# Patient Record
Sex: MALE | Race: WHITE | NOT HISPANIC OR LATINO | Employment: OTHER | URBAN - METROPOLITAN AREA
[De-identification: names, ages, dates, MRNs, and addresses within clinical notes are randomized per-mention and may not be internally consistent; named-entity substitution may affect disease eponyms.]

---

## 2023-11-14 DIAGNOSIS — I48.91 ATRIAL FIBRILLATION, UNSPECIFIED TYPE (MULTI): ICD-10-CM

## 2023-11-14 RX ORDER — APIXABAN 5 MG/1
5 TABLET, FILM COATED ORAL 2 TIMES DAILY
COMMUNITY
Start: 2023-02-20 | End: 2023-11-14 | Stop reason: SDUPTHER

## 2023-11-14 RX ORDER — APIXABAN 5 MG/1
5 TABLET, FILM COATED ORAL 2 TIMES DAILY
Qty: 180 TABLET | Refills: 0 | Status: SHIPPED
Start: 2023-11-14 | End: 2024-03-12 | Stop reason: SDUPTHER

## 2023-11-29 ENCOUNTER — TELEPHONE (OUTPATIENT)
Dept: CARDIOLOGY | Facility: CLINIC | Age: 78
End: 2023-11-29

## 2023-11-29 NOTE — TELEPHONE ENCOUNTER
Good afternoon,  Patient was in the office saying that he sign up for the Patient Assistant Program for Eliquis. The trial is going until Dec 31. He want to make sure that you received the application or some sort of papers in order for him to continue in the program. Any questions please call the patient at [phone].  Thanks  Afsaneh

## 2023-12-05 RX ORDER — FLUTICASONE PROPIONATE AND SALMETEROL 50; 250 UG/1; UG/1
1 POWDER RESPIRATORY (INHALATION) 2 TIMES DAILY
COMMUNITY
Start: 2023-11-06

## 2023-12-05 RX ORDER — ATROPINE SULFATE 10 MG/ML
1 SOLUTION/ DROPS OPHTHALMIC
COMMUNITY
Start: 2023-04-06

## 2023-12-05 RX ORDER — ALBUTEROL SULFATE 90 UG/1
2 AEROSOL, METERED RESPIRATORY (INHALATION)
COMMUNITY
Start: 2023-09-08

## 2023-12-05 RX ORDER — LOSARTAN POTASSIUM 50 MG/1
50 TABLET ORAL DAILY
COMMUNITY

## 2023-12-05 RX ORDER — DORZOLAMIDE HYDROCHLORIDE AND TIMOLOL MALEATE 20; 5 MG/ML; MG/ML
1 SOLUTION/ DROPS OPHTHALMIC 2 TIMES DAILY
COMMUNITY
Start: 2023-02-14

## 2023-12-05 RX ORDER — FLUTICASONE PROPIONATE 50 MCG
2 SPRAY, SUSPENSION (ML) NASAL DAILY
COMMUNITY
Start: 2023-02-21

## 2023-12-05 RX ORDER — ATORVASTATIN CALCIUM 20 MG/1
20 TABLET, FILM COATED ORAL DAILY
COMMUNITY

## 2023-12-05 RX ORDER — CHLORTHALIDONE 25 MG/1
12.5 TABLET ORAL DAILY
COMMUNITY

## 2023-12-05 RX ORDER — BRIMONIDINE TARTRATE 2 MG/ML
2 SOLUTION/ DROPS OPHTHALMIC 2 TIMES DAILY
COMMUNITY

## 2023-12-05 RX ORDER — LATANOPROST 50 UG/ML
1 SOLUTION/ DROPS OPHTHALMIC DAILY
COMMUNITY
Start: 2023-02-14

## 2023-12-05 RX ORDER — TIMOLOL MALEATE 5 MG/ML
SOLUTION/ DROPS OPHTHALMIC DAILY
COMMUNITY
Start: 2021-06-15

## 2023-12-05 RX ORDER — METOPROLOL TARTRATE 100 MG/1
100 TABLET ORAL EVERY 12 HOURS
COMMUNITY

## 2024-03-12 DIAGNOSIS — I48.91 ATRIAL FIBRILLATION, UNSPECIFIED TYPE (MULTI): ICD-10-CM

## 2024-03-12 RX ORDER — APIXABAN 5 MG/1
5 TABLET, FILM COATED ORAL 2 TIMES DAILY
Qty: 180 TABLET | Refills: 0 | Status: SHIPPED | OUTPATIENT
Start: 2024-03-12

## 2024-04-29 ENCOUNTER — TELEPHONE (OUTPATIENT)
Dept: CARDIOLOGY | Facility: CLINIC | Age: 79
End: 2024-04-29

## 2024-05-16 PROBLEM — I48.21 PERMANENT ATRIAL FIBRILLATION (MULTI): Status: ACTIVE | Noted: 2024-05-16

## 2024-06-03 ENCOUNTER — APPOINTMENT (OUTPATIENT)
Dept: CARDIOLOGY | Facility: CLINIC | Age: 79
End: 2024-06-03
Payer: MEDICARE

## 2024-06-25 ENCOUNTER — PREP FOR PROCEDURE (OUTPATIENT)
Dept: SURGERY | Facility: HOSPITAL | Age: 79
End: 2024-06-25

## 2024-06-25 ENCOUNTER — APPOINTMENT (OUTPATIENT)
Dept: SURGERY | Facility: CLINIC | Age: 79
End: 2024-06-25
Payer: MEDICARE

## 2024-06-25 VITALS
DIASTOLIC BLOOD PRESSURE: 105 MMHG | RESPIRATION RATE: 18 BRPM | WEIGHT: 185.6 LBS | OXYGEN SATURATION: 96 % | BODY MASS INDEX: 26.57 KG/M2 | TEMPERATURE: 98.5 F | SYSTOLIC BLOOD PRESSURE: 152 MMHG | HEART RATE: 103 BPM | HEIGHT: 70 IN

## 2024-06-25 DIAGNOSIS — K40.90 INGUINAL HERNIA OF LEFT SIDE WITHOUT OBSTRUCTION OR GANGRENE: Primary | ICD-10-CM

## 2024-06-25 DIAGNOSIS — K40.90 INGUINAL HERNIA OF LEFT SIDE WITHOUT OBSTRUCTION OR GANGRENE: ICD-10-CM

## 2024-06-25 DIAGNOSIS — I48.21 PERMANENT ATRIAL FIBRILLATION (MULTI): Primary | ICD-10-CM

## 2024-06-25 PROCEDURE — 1159F MED LIST DOCD IN RCRD: CPT | Performed by: SURGERY

## 2024-06-25 PROCEDURE — 1160F RVW MEDS BY RX/DR IN RCRD: CPT | Performed by: SURGERY

## 2024-06-25 PROCEDURE — 99204 OFFICE O/P NEW MOD 45 MIN: CPT | Performed by: SURGERY

## 2024-06-25 RX ORDER — CEFAZOLIN SODIUM 2 G/100ML
2 INJECTION, SOLUTION INTRAVENOUS ONCE
Status: CANCELLED | OUTPATIENT
Start: 2024-07-05 | End: 2024-06-25

## 2024-06-25 RX ORDER — SODIUM CHLORIDE, SODIUM LACTATE, POTASSIUM CHLORIDE, CALCIUM CHLORIDE 600; 310; 30; 20 MG/100ML; MG/100ML; MG/100ML; MG/100ML
100 INJECTION, SOLUTION INTRAVENOUS CONTINUOUS
Status: CANCELLED | OUTPATIENT
Start: 2024-07-05

## 2024-06-25 NOTE — PROGRESS NOTES
History Of Present Illness  HPI 78-year-old male with history of permanent atrial fibrillation on Eliquis developed pain in his left groin after working and going up and down a ladder approximately 6 weeks ago.  He was initially evaluated by his primary care physician for pain.  He followed up approximately a week ago where physical exam at that point raised the question of a left inguinal hernia.  He describes the pain as burning moving to his left scrotum.  Denies right groin pain or mass.  He has had a colonoscopy within 10 years.  There is no history of blood mucus or change in bowel habits.  No history of chest pain shortness of breath or leg pain.  No family history of bleeding or infectious complications related to surgery     Past Medical History  He has a past medical history of Essential (primary) hypertension (05/09/2019), Personal history of other diseases of the circulatory system (04/27/2018), and Personal history of other diseases of the circulatory system (05/04/2018).    Surgical History  He has no past surgical history on file.     Social History  He has no history on file for tobacco use, alcohol use, and drug use.    Family History  No family history on file.     Allergies  Patient has no known allergies.    Review of Systems 10 review of systems otherwise negative     Visit Vitals  BP (!) 152/105   Pulse 103   Temp 36.9 °C (98.5 °F) (Temporal)   Resp 18        Physical Exam GENERAL  MENTAL STATUS - Alert. GENERAL APPEARANCE - Cooperative and well groomed. ORIENTATION - Oriented X4. BUILD & NUTRITION - Well nourished and well developed. HYDRATION - Well hydrated.    INTEGUMENTARY  GENERAL CHARACTERISTICS - Overall examination of the patient´s skin reveals no rashes. COLOR - not icteric. SKIN MOISTURE - normal skin moisture. TEMPERATURE - normal worth is noted.    HEAD AND NECK  HEAD  HEAD SHAPE - Atraumatic and normocephalic.  TRACHEA - midline.  THYROID  GLAND CHARACTERISTICS - normal size and  "consistency and no palpable nodules.    EYE  SCLERA/CONJUNCTIVA - BILATERAL - Anicteric.    CHEST AND LUNG EXAM  AUSCULTATION -  BREATH SOUNDS - Clear.    BREAST - Did not examine.    CARDIOVASCULAR  AUSCULTATION: RHYTHM - Regular. HEART SOUNDS - Normal heart sounds.  MURMURS & OTHER HEART SOUNDS - Auscultation of the heart reveals irregular rate and no murmurs.    ABDOMEN  PALPATION/PERCUSSION - Palpation and percussion of the abdomen reveal soft, non tender, no mass and no hepatosplenomegaly.  Reducible left inguinal hernia.  No obvious right inguinal hernia no femoral hernia bilaterally.  Less than 1 cm defect at the umbilicus.    LYMPHATIC  GENERAL LYMPHATICS  BREAST LYMPHATIC EXAM - No cervical adenopathy, supraclavicular adenopathy or axilliary adenopathy.         Last Recorded Vitals  Blood pressure (!) 152/105, pulse 103, temperature 36.9 °C (98.5 °F), temperature source Temporal, resp. rate 18, height 1.765 m (5' 9.5\"), weight 84.2 kg (185 lb 9.6 oz), SpO2 96%.    Relevant Results      No results found.      @[email or web address]@    Assessment/Plan       Symptomatic left inguinal hernia.  I have advocated immediate medical attention for painful and or nonreducible mass and recommended elective repair of the hernia. I have discussed the risks ,benefits and the alternatives to repair as well as the risks and benefits of the alternatives including but not limited to bleeding and infection. I have placed the risk of recurrence with mesh at 1% and up to 2-5% without mesh. I discussed the risk of infection and rejection of the mesh and need for repeat intervention as well as the risk of neuropathy, partial or permanent. I discussed potential for bowel resection in the face of strangulation and/or incarceration, limited use of mesh under these circumstances and increased risk of recurrence.  I have discussed laparoscopic robotically assisted repair of the hernia.  I placed the risk of contralateral hernia at " approximately 20% and if present he wishes to proceed with repair.  We discussed the risk of conversion open procedure injury to surrounding structures.  Questions were answered and the patient agrees to proceed.   Will hold Eliquis 5 days  I spent 30 minutes in the professional and overall care of this patient.      Baldemar Child MD

## 2024-06-25 NOTE — PATIENT INSTRUCTIONS
Thank you for choosing [facility].  Today's exam reveals a left inguinal hernia.  Laparoscopic robotically assisted repair of the left with possible right inguinal hernia repair will be performed Friday, July 5 as discussed.  Please seek immediate medical attention should you develop a painful mass in either groin which you are unable to reduce the recurrence of the abdominal cavity prior to your proposed procedure.  Please stop your Eliquis 5 days prior to surgery.  Contact the office for any questions regarding today's exam or your proposed procedure

## 2024-06-26 ENCOUNTER — APPOINTMENT (OUTPATIENT)
Dept: CARDIOLOGY | Facility: CLINIC | Age: 79
End: 2024-06-26
Payer: MEDICARE

## 2024-06-26 VITALS
HEIGHT: 69 IN | SYSTOLIC BLOOD PRESSURE: 120 MMHG | BODY MASS INDEX: 27.4 KG/M2 | HEART RATE: 110 BPM | WEIGHT: 185 LBS | OXYGEN SATURATION: 96 % | DIASTOLIC BLOOD PRESSURE: 80 MMHG

## 2024-06-26 DIAGNOSIS — I48.21 PERMANENT ATRIAL FIBRILLATION (MULTI): ICD-10-CM

## 2024-06-26 PROCEDURE — 99213 OFFICE O/P EST LOW 20 MIN: CPT | Performed by: INTERNAL MEDICINE

## 2024-06-26 PROCEDURE — 93000 ELECTROCARDIOGRAM COMPLETE: CPT | Performed by: INTERNAL MEDICINE

## 2024-06-26 PROCEDURE — 1159F MED LIST DOCD IN RCRD: CPT | Performed by: INTERNAL MEDICINE

## 2024-06-26 NOTE — LETTER
"June 26, 2024     Alfred Garcia MD  [address] [address]    Patient: Naresh Vang   YOB: 1945   Date of Visit: 6/26/2024       Dear Dr. Alfred Garcia MD:    Thank you for referring Naresh Vang to me for evaluation. Below are my notes for this consultation.  If you have questions, please do not hesitate to call me. I look forward to following your patient along with you.       Sincerely,     James C Ramicone, DO      CC: Baldemar Child MD  ______________________________________________________________________________________    History Of Present Illness:      This is a 78-year-old male with permanent atrial fibrillation.  He is going to have hernia repair surgery in the next 1 to 2 weeks.  No new cardiac complaints at this time.    Review of Systems  Other review of systems negative     Last Recorded Vitals:      8/17/2020     9:15 AM 6/14/2021     8:53 AM 6/14/2021     9:15 AM 6/6/2022     9:00 AM 6/12/2023     9:11 AM 6/25/2024     2:59 PM 6/26/2024     9:11 AM   Vitals   Systolic 122 130 120 134 134 152 120   Diastolic 88 94 82 80 80 105 80   Heart Rate 76 92  112 75 103 110   Temp      36.9 °C (98.5 °F)    Resp      18    Height (in)  1.765 m (5' 9.5\")  1.765 m (5' 9.5\") 1.765 m (5' 9.5\") 1.765 m (5' 9.5\") 1.753 m (5' 9\")   Weight (lb) 180 178.5  175 180 185.6 185   BMI  25.98 kg/m2  25.47 kg/m2 26.2 kg/m2 27.02 kg/m2 27.32 kg/m2   BSA (m2)  1.99 m2  1.97 m2 2 m2 2.03 m2 2.02 m2   Visit Report      Report Report          Allergies:  Patient has no known allergies.    Outpatient Medications:  Current Outpatient Medications   Medication Instructions   • Advair Diskus 250-50 mcg/dose diskus inhaler 1 puff, inhalation, 2 times daily   • albuterol 90 mcg/actuation inhaler 2 puffs, inhalation, 3 times daily RT   • atorvastatin (LIPITOR) 20 mg, oral, Daily   • atropine 1 % ophthalmic solution 1 drop, ophthalmic (eye), Daily RT   • brimonidine (AlphaGAN) 0.2 % " ophthalmic solution 2 drops, Both Eyes, 2 times daily   • chlorthalidone (HYGROTON) 12.5 mg, oral, Daily   • dorzolamide-timoloL (Cosopt) 22.3-6.8 mg/mL ophthalmic solution 1 drop, Both Eyes, 2 times daily   • Eliquis 5 mg, oral, 2 times daily   • fluticasone (Flonase) 50 mcg/actuation nasal spray 2 sprays, Each Nostril, Daily   • latanoprost (Xalatan) 0.005 % ophthalmic solution 1 drop, Both Eyes, Daily   • losartan (COZAAR) 50 mg, oral, Daily   • metoprolol tartrate (LOPRESSOR) 100 mg, oral, Every 12 hours   • timolol (Timoptic) 0.5 % ophthalmic solution Both Eyes, Daily       Physical Exam:    General Appearance:  Alert, oriented, no distress  Skin:  Warm and dry  Head and Neck:  No elevation of JVP, no carotid bruits  Cardiac Exam:  Rhythm is irregular, S1 and S2 are normal, no murmur S3 or S4  Lungs:  Clear to auscultation  Extremities:  no edema  Neurologic:  No focal deficits  Psychiatric:  Appropriate mood and behavior         Cardiology Tests:  I have personally review the diagnostic cardiac testing and my interpretation is as follows:    EKG: Atrial fibrillation with a controlled ventricular response  Holter monitor June 2023: Atrial fibrillation with an average heart rate of 99 bpm      Assessment/Plan  Problem List Items Addressed This Visit             ICD-10-CM    Permanent atrial fibrillation (Multi) I48.21     1.  Permanent atrial fibrillation: Naresh remains in atrial fibrillation with a controlled ventricular response.  Continue metoprolol at the same dosage and he will remain on Eliquis for anticoagulation.  In preparation for the hernia repair surgery, he can hold Eliquis 2 to 3 days preoperatively, then resume when cleared from a surgical perspective postoperatively.         Relevant Orders    ECG 12 lead (Clinic Performed) (Completed)    Transthoracic echo (TTE) complete James C Ramicone, DO

## 2024-06-26 NOTE — ASSESSMENT & PLAN NOTE
1.  Permanent atrial fibrillation: Naresh remains in atrial fibrillation with a controlled ventricular response.  Continue metoprolol at the same dosage and he will remain on Eliquis for anticoagulation.  In preparation for the hernia repair surgery, he can hold Eliquis 2 to 3 days preoperatively, then resume when cleared from a surgical perspective postoperatively.

## 2024-06-26 NOTE — PATIENT INSTRUCTIONS
Stop your Eliquis 2 full days before your hernia repair surgery.    Follow up with Dr. Ramicone in 1 year.

## 2024-06-26 NOTE — PROGRESS NOTES
"History Of Present Illness:      This is a 78-year-old male with permanent atrial fibrillation.  He is going to have hernia repair surgery in the next 1 to 2 weeks.  No new cardiac complaints at this time.    Review of Systems  Other review of systems negative     Last Recorded Vitals:      8/17/2020     9:15 AM 6/14/2021     8:53 AM 6/14/2021     9:15 AM 6/6/2022     9:00 AM 6/12/2023     9:11 AM 6/25/2024     2:59 PM 6/26/2024     9:11 AM   Vitals   Systolic 122 130 120 134 134 152 120   Diastolic 88 94 82 80 80 105 80   Heart Rate 76 92  112 75 103 110   Temp      36.9 °C (98.5 °F)    Resp      18    Height (in)  1.765 m (5' 9.5\")  1.765 m (5' 9.5\") 1.765 m (5' 9.5\") 1.765 m (5' 9.5\") 1.753 m (5' 9\")   Weight (lb) 180 178.5  175 180 185.6 185   BMI  25.98 kg/m2  25.47 kg/m2 26.2 kg/m2 27.02 kg/m2 27.32 kg/m2   BSA (m2)  1.99 m2  1.97 m2 2 m2 2.03 m2 2.02 m2   Visit Report      Report Report          Allergies:  Patient has no known allergies.    Outpatient Medications:  Current Outpatient Medications   Medication Instructions    Advair Diskus 250-50 mcg/dose diskus inhaler 1 puff, inhalation, 2 times daily    albuterol 90 mcg/actuation inhaler 2 puffs, inhalation, 3 times daily RT    atorvastatin (LIPITOR) 20 mg, oral, Daily    atropine 1 % ophthalmic solution 1 drop, ophthalmic (eye), Daily RT    brimonidine (AlphaGAN) 0.2 % ophthalmic solution 2 drops, Both Eyes, 2 times daily    chlorthalidone (HYGROTON) 12.5 mg, oral, Daily    dorzolamide-timoloL (Cosopt) 22.3-6.8 mg/mL ophthalmic solution 1 drop, Both Eyes, 2 times daily    Eliquis 5 mg, oral, 2 times daily    fluticasone (Flonase) 50 mcg/actuation nasal spray 2 sprays, Each Nostril, Daily    latanoprost (Xalatan) 0.005 % ophthalmic solution 1 drop, Both Eyes, Daily    losartan (COZAAR) 50 mg, oral, Daily    metoprolol tartrate (LOPRESSOR) 100 mg, oral, Every 12 hours    timolol (Timoptic) 0.5 % ophthalmic solution Both Eyes, Daily       Physical " Exam:    General Appearance:  Alert, oriented, no distress  Skin:  Warm and dry  Head and Neck:  No elevation of JVP, no carotid bruits  Cardiac Exam:  Rhythm is irregular, S1 and S2 are normal, no murmur S3 or S4  Lungs:  Clear to auscultation  Extremities:  no edema  Neurologic:  No focal deficits  Psychiatric:  Appropriate mood and behavior         Cardiology Tests:  I have personally review the diagnostic cardiac testing and my interpretation is as follows:    EKG: Atrial fibrillation with a controlled ventricular response  Holter monitor June 2023: Atrial fibrillation with an average heart rate of 99 bpm      Assessment/Plan   Problem List Items Addressed This Visit             ICD-10-CM    Permanent atrial fibrillation (Multi) I48.21     1.  Permanent atrial fibrillation: Naresh remains in atrial fibrillation with a controlled ventricular response.  Continue metoprolol at the same dosage and he will remain on Eliquis for anticoagulation.  In preparation for the hernia repair surgery, he can hold Eliquis 2 to 3 days preoperatively, then resume when cleared from a surgical perspective postoperatively.         Relevant Orders    ECG 12 lead (Clinic Performed) (Completed)    Transthoracic echo (TTE) complete       James C Ramicone, DO

## 2024-06-27 ENCOUNTER — LAB (OUTPATIENT)
Dept: LAB | Facility: LAB | Age: 79
End: 2024-06-27
Payer: MEDICARE

## 2024-06-27 DIAGNOSIS — K40.90 INGUINAL HERNIA OF LEFT SIDE WITHOUT OBSTRUCTION OR GANGRENE: ICD-10-CM

## 2024-06-27 LAB
ANION GAP SERPL CALC-SCNC: 12 MMOL/L (ref 10–20)
BUN SERPL-MCNC: 24 MG/DL (ref 6–23)
CALCIUM SERPL-MCNC: 9.7 MG/DL (ref 8.6–10.3)
CHLORIDE SERPL-SCNC: 101 MMOL/L (ref 98–107)
CO2 SERPL-SCNC: 29 MMOL/L (ref 21–32)
CREAT SERPL-MCNC: 1.22 MG/DL (ref 0.5–1.3)
EGFRCR SERPLBLD CKD-EPI 2021: 61 ML/MIN/1.73M*2
ERYTHROCYTE [DISTWIDTH] IN BLOOD BY AUTOMATED COUNT: 12.7 % (ref 11.5–14.5)
GLUCOSE SERPL-MCNC: 104 MG/DL (ref 74–99)
HCT VFR BLD AUTO: 42.7 % (ref 41–52)
HGB BLD-MCNC: 14.9 G/DL (ref 13.5–17.5)
MCH RBC QN AUTO: 31.5 PG (ref 26–34)
MCHC RBC AUTO-ENTMCNC: 34.9 G/DL (ref 32–36)
MCV RBC AUTO: 90 FL (ref 80–100)
NRBC BLD-RTO: 0 /100 WBCS (ref 0–0)
PLATELET # BLD AUTO: 292 X10*3/UL (ref 150–450)
POTASSIUM SERPL-SCNC: 4 MMOL/L (ref 3.5–5.3)
RBC # BLD AUTO: 4.73 X10*6/UL (ref 4.5–5.9)
SODIUM SERPL-SCNC: 138 MMOL/L (ref 136–145)
WBC # BLD AUTO: 10.4 X10*3/UL (ref 4.4–11.3)

## 2024-06-27 PROCEDURE — 80048 BASIC METABOLIC PNL TOTAL CA: CPT

## 2024-06-27 PROCEDURE — 85027 COMPLETE CBC AUTOMATED: CPT

## 2024-06-27 PROCEDURE — 36415 COLL VENOUS BLD VENIPUNCTURE: CPT

## 2024-07-05 ENCOUNTER — ANESTHESIA (OUTPATIENT)
Dept: OPERATING ROOM | Facility: HOSPITAL | Age: 79
End: 2024-07-05
Payer: MEDICARE

## 2024-07-05 ENCOUNTER — ANESTHESIA EVENT (OUTPATIENT)
Dept: OPERATING ROOM | Facility: HOSPITAL | Age: 79
End: 2024-07-05
Payer: MEDICARE

## 2024-07-05 ENCOUNTER — HOSPITAL ENCOUNTER (OUTPATIENT)
Facility: HOSPITAL | Age: 79
Setting detail: OUTPATIENT SURGERY
Discharge: HOME | End: 2024-07-05
Attending: SURGERY | Admitting: SURGERY
Payer: MEDICARE

## 2024-07-05 VITALS
SYSTOLIC BLOOD PRESSURE: 105 MMHG | WEIGHT: 184.97 LBS | BODY MASS INDEX: 27.4 KG/M2 | HEIGHT: 69 IN | HEART RATE: 85 BPM | DIASTOLIC BLOOD PRESSURE: 68 MMHG | RESPIRATION RATE: 18 BRPM | OXYGEN SATURATION: 96 % | TEMPERATURE: 97.5 F

## 2024-07-05 DIAGNOSIS — K40.90 INGUINAL HERNIA OF LEFT SIDE WITHOUT OBSTRUCTION OR GANGRENE: Primary | ICD-10-CM

## 2024-07-05 PROBLEM — J45.909 ASTHMA (HHS-HCC): Status: ACTIVE | Noted: 2024-07-05

## 2024-07-05 PROBLEM — I10 HTN (HYPERTENSION): Status: ACTIVE | Noted: 2024-07-05

## 2024-07-05 PROBLEM — E78.5 HYPERLIPIDEMIA: Status: ACTIVE | Noted: 2024-07-05

## 2024-07-05 PROBLEM — H40.89 OTHER SPECIFIED GLAUCOMA: Status: ACTIVE | Noted: 2024-07-05

## 2024-07-05 PROCEDURE — 2500000004 HC RX 250 GENERAL PHARMACY W/ HCPCS (ALT 636 FOR OP/ED): Performed by: ANESTHESIOLOGY

## 2024-07-05 PROCEDURE — 2500000004 HC RX 250 GENERAL PHARMACY W/ HCPCS (ALT 636 FOR OP/ED): Mod: JZ | Performed by: SURGERY

## 2024-07-05 PROCEDURE — 2780000003 HC OR 278 NO HCPCS: Performed by: SURGERY

## 2024-07-05 PROCEDURE — 3600000004 HC OR TIME - INITIAL BASE CHARGE - PROCEDURE LEVEL FOUR: Performed by: SURGERY

## 2024-07-05 PROCEDURE — 49650 LAP ING HERNIA REPAIR INIT: CPT | Performed by: SURGERY

## 2024-07-05 PROCEDURE — 2500000004 HC RX 250 GENERAL PHARMACY W/ HCPCS (ALT 636 FOR OP/ED)

## 2024-07-05 PROCEDURE — 2500000005 HC RX 250 GENERAL PHARMACY W/O HCPCS: Performed by: ANESTHESIOLOGY

## 2024-07-05 PROCEDURE — 7100000002 HC RECOVERY ROOM TIME - EACH INCREMENTAL 1 MINUTE: Performed by: SURGERY

## 2024-07-05 PROCEDURE — 2720000007 HC OR 272 NO HCPCS: Performed by: SURGERY

## 2024-07-05 PROCEDURE — 3700000002 HC GENERAL ANESTHESIA TIME - EACH INCREMENTAL 1 MINUTE: Performed by: SURGERY

## 2024-07-05 PROCEDURE — 7100000010 HC PHASE TWO TIME - EACH INCREMENTAL 1 MINUTE: Performed by: SURGERY

## 2024-07-05 PROCEDURE — 3700000001 HC GENERAL ANESTHESIA TIME - INITIAL BASE CHARGE: Performed by: SURGERY

## 2024-07-05 PROCEDURE — 7100000009 HC PHASE TWO TIME - INITIAL BASE CHARGE: Performed by: SURGERY

## 2024-07-05 PROCEDURE — 7100000001 HC RECOVERY ROOM TIME - INITIAL BASE CHARGE: Performed by: SURGERY

## 2024-07-05 PROCEDURE — 2500000004 HC RX 250 GENERAL PHARMACY W/ HCPCS (ALT 636 FOR OP/ED): Performed by: SURGERY

## 2024-07-05 PROCEDURE — C1781 MESH (IMPLANTABLE): HCPCS | Performed by: SURGERY

## 2024-07-05 PROCEDURE — 2500000005 HC RX 250 GENERAL PHARMACY W/O HCPCS

## 2024-07-05 PROCEDURE — 2500000005 HC RX 250 GENERAL PHARMACY W/O HCPCS: Performed by: SURGERY

## 2024-07-05 PROCEDURE — 3600000009 HC OR TIME - EACH INCREMENTAL 1 MINUTE - PROCEDURE LEVEL FOUR: Performed by: SURGERY

## 2024-07-05 DEVICE — LAPAROSCOPIC SELF-FIXATING MESH POLYESTER WITH POLYLACTIC ACID GRIPS AND COLLAGEN FILM
Type: IMPLANTABLE DEVICE | Site: INGUINAL | Status: FUNCTIONAL
Brand: PROGRIP

## 2024-07-05 RX ORDER — PHENYLEPHRINE HCL IN 0.9% NACL 1 MG/10 ML
SYRINGE (ML) INTRAVENOUS AS NEEDED
Status: DISCONTINUED | OUTPATIENT
Start: 2024-07-05 | End: 2024-07-05

## 2024-07-05 RX ORDER — SODIUM CHLORIDE, SODIUM LACTATE, POTASSIUM CHLORIDE, CALCIUM CHLORIDE 600; 310; 30; 20 MG/100ML; MG/100ML; MG/100ML; MG/100ML
100 INJECTION, SOLUTION INTRAVENOUS CONTINUOUS
Status: DISCONTINUED | OUTPATIENT
Start: 2024-07-05 | End: 2024-07-05 | Stop reason: HOSPADM

## 2024-07-05 RX ORDER — OXYCODONE HYDROCHLORIDE 5 MG/1
5 TABLET ORAL EVERY 6 HOURS PRN
Qty: 10 TABLET | Refills: 0 | Status: SHIPPED | OUTPATIENT
Start: 2024-07-05 | End: 2024-07-12

## 2024-07-05 RX ORDER — CLONIDINE 100 UG/ML
INJECTION, SOLUTION EPIDURAL AS NEEDED
Status: DISCONTINUED | OUTPATIENT
Start: 2024-07-05 | End: 2024-07-05 | Stop reason: HOSPADM

## 2024-07-05 RX ORDER — ONDANSETRON HYDROCHLORIDE 2 MG/ML
INJECTION, SOLUTION INTRAVENOUS AS NEEDED
Status: DISCONTINUED | OUTPATIENT
Start: 2024-07-05 | End: 2024-07-05

## 2024-07-05 RX ORDER — BUPIVACAINE HYDROCHLORIDE 2.5 MG/ML
INJECTION, SOLUTION EPIDURAL; INFILTRATION; INTRACAUDAL AS NEEDED
Status: DISCONTINUED | OUTPATIENT
Start: 2024-07-05 | End: 2024-07-05 | Stop reason: HOSPADM

## 2024-07-05 RX ORDER — HYDROMORPHONE HYDROCHLORIDE 1 MG/ML
1 INJECTION, SOLUTION INTRAMUSCULAR; INTRAVENOUS; SUBCUTANEOUS EVERY 5 MIN PRN
Status: DISCONTINUED | OUTPATIENT
Start: 2024-07-05 | End: 2024-07-05 | Stop reason: HOSPADM

## 2024-07-05 RX ORDER — BUPIVACAINE HYDROCHLORIDE 5 MG/ML
INJECTION, SOLUTION PERINEURAL AS NEEDED
Status: DISCONTINUED | OUTPATIENT
Start: 2024-07-05 | End: 2024-07-05 | Stop reason: HOSPADM

## 2024-07-05 RX ORDER — CEFAZOLIN SODIUM 2 G/100ML
2 INJECTION, SOLUTION INTRAVENOUS ONCE
Status: COMPLETED | OUTPATIENT
Start: 2024-07-05 | End: 2024-07-05

## 2024-07-05 RX ORDER — WATER 1 ML/ML
IRRIGANT IRRIGATION AS NEEDED
Status: DISCONTINUED | OUTPATIENT
Start: 2024-07-05 | End: 2024-07-05 | Stop reason: HOSPADM

## 2024-07-05 RX ORDER — PROPOFOL 10 MG/ML
INJECTION, EMULSION INTRAVENOUS AS NEEDED
Status: DISCONTINUED | OUTPATIENT
Start: 2024-07-05 | End: 2024-07-05

## 2024-07-05 RX ORDER — MEPERIDINE HYDROCHLORIDE 25 MG/ML
12.5 INJECTION INTRAMUSCULAR; INTRAVENOUS; SUBCUTANEOUS EVERY 10 MIN PRN
Status: DISCONTINUED | OUTPATIENT
Start: 2024-07-05 | End: 2024-07-05 | Stop reason: HOSPADM

## 2024-07-05 RX ORDER — ACETAMINOPHEN 325 MG/1
650 TABLET ORAL EVERY 6 HOURS PRN
Qty: 8 TABLET | Refills: 0 | Status: SHIPPED | OUTPATIENT
Start: 2024-07-05 | End: 2024-07-07

## 2024-07-05 RX ORDER — LIDOCAINE HYDROCHLORIDE 10 MG/ML
0.1 INJECTION INFILTRATION; PERINEURAL ONCE
Status: DISCONTINUED | OUTPATIENT
Start: 2024-07-05 | End: 2024-07-05 | Stop reason: HOSPADM

## 2024-07-05 RX ORDER — ROCURONIUM BROMIDE 10 MG/ML
INJECTION, SOLUTION INTRAVENOUS AS NEEDED
Status: DISCONTINUED | OUTPATIENT
Start: 2024-07-05 | End: 2024-07-05

## 2024-07-05 RX ORDER — MIDAZOLAM HYDROCHLORIDE 1 MG/ML
INJECTION, SOLUTION INTRAMUSCULAR; INTRAVENOUS AS NEEDED
Status: DISCONTINUED | OUTPATIENT
Start: 2024-07-05 | End: 2024-07-05

## 2024-07-05 RX ORDER — LIDOCAINE HCL/PF 100 MG/5ML
SYRINGE (ML) INTRAVENOUS AS NEEDED
Status: DISCONTINUED | OUTPATIENT
Start: 2024-07-05 | End: 2024-07-05

## 2024-07-05 RX ORDER — FENTANYL CITRATE 50 UG/ML
INJECTION, SOLUTION INTRAMUSCULAR; INTRAVENOUS AS NEEDED
Status: DISCONTINUED | OUTPATIENT
Start: 2024-07-05 | End: 2024-07-05

## 2024-07-05 SDOH — HEALTH STABILITY: MENTAL HEALTH: CURRENT SMOKER: 0

## 2024-07-05 ASSESSMENT — PAIN SCALES - GENERAL
PAINLEVEL_OUTOF10: 4
PAINLEVEL_OUTOF10: 0 - NO PAIN
PAIN_LEVEL: 0
PAINLEVEL_OUTOF10: 0 - NO PAIN

## 2024-07-05 ASSESSMENT — PAIN - FUNCTIONAL ASSESSMENT
PAIN_FUNCTIONAL_ASSESSMENT: 0-10
PAIN_FUNCTIONAL_ASSESSMENT: 0-10

## 2024-07-05 ASSESSMENT — PAIN DESCRIPTION - LOCATION: LOCATION: ABDOMEN

## 2024-07-05 ASSESSMENT — COLUMBIA-SUICIDE SEVERITY RATING SCALE - C-SSRS
2. HAVE YOU ACTUALLY HAD ANY THOUGHTS OF KILLING YOURSELF?: NO
1. IN THE PAST MONTH, HAVE YOU WISHED YOU WERE DEAD OR WISHED YOU COULD GO TO SLEEP AND NOT WAKE UP?: NO
6. HAVE YOU EVER DONE ANYTHING, STARTED TO DO ANYTHING, OR PREPARED TO DO ANYTHING TO END YOUR LIFE?: NO

## 2024-07-05 NOTE — ANESTHESIA PREPROCEDURE EVALUATION
Patient: Naresh Vang    Procedure Information       Date/Time: 07/05/24 0730    Procedure: ROBOTIC ASSISTED LEFT POSSIBLE RIGHT INGUINAL HERNIA REPAIR/ POSSIBLE OPEN (Left)    Location: [facility] 09 / [facility]    Surgeons: Baldemar Child MD            Relevant Problems   Anesthesia (within normal limits)      Cardiac   (+) HTN (hypertension)   (+) Hyperlipidemia   (+) Permanent atrial fibrillation (Multi)      Pulmonary   (+) Asthma (HHS-HCC)      HEENT   (+) Other specified glaucoma      Digestive   (+) Inguinal hernia of left side without obstruction or gangrene       Clinical information reviewed:    Allergies  Meds               NPO Detail:  NPO/Void Status  Carbohydrate Drink Given Prior to Surgery? : N  Date of Last Liquid: 07/05/24  Time of Last Liquid: 0000  Date of Last Solid: 07/04/24  Time of Last Solid: 1900  Last Intake Type: Light meal  Time of Last Void: 0635         Physical Exam    Airway  Mallampati: II  TM distance: >3 FB  Neck ROM: full     Cardiovascular   Rhythm: irregular  Rate: normal     Dental   (+) upper dentures, lower dentures     Pulmonary - normal exam     Abdominal - normal exam             Anesthesia Plan    History of general anesthesia?: yes  History of complications of general anesthesia?: no    ASA 3     general     The patient is not a current smoker.  Patient was previously instructed to abstain from smoking on day of procedure.  Patient did not smoke on day of procedure.    intravenous induction   Postoperative administration of opioids is intended.  Trial extubation is planned.  Anesthetic plan and risks discussed with patient.  Use of blood products discussed with patient who consented to blood products.    Plan discussed with CRNA and CAA.

## 2024-07-05 NOTE — ANESTHESIA PROCEDURE NOTES
Airway  Date/Time: 7/5/2024 7:38 AM  Urgency: elective    Airway not difficult    Staffing  Performed: attending   Authorized by: Margarito Villalobos MD    Performed by: REYNOLD Boyce-LARISSA  Patient location during procedure: OR    Indications and Patient Condition  Indications for airway management: anesthesia and airway protection  Spontaneous Ventilation: absent  Sedation level: deep  Preoxygenated: yes  Patient position: sniffing  MILS maintained throughout  Mask difficulty assessment: 2 - vent by mask + OA or adjuvant +/- NMBA  Planned trial extubation    Final Airway Details  Final airway type: endotracheal airway      Successful airway: ETT  Cuffed: yes   Successful intubation technique: video laryngoscopy (Hobson)  Facilitating devices/methods: intubating stylet  Endotracheal tube insertion site: oral  Blade type: Hobson.  Blade size: #4  ETT size (mm): 7.5  Cormack-Lehane Classification: grade I - full view of glottis  Placement verified by: chest auscultation and capnometry   Cuff volume (mL): 10  Measured from: lips  ETT to lips (cm): 22  Number of attempts at approach: 1    Additional Comments  Atraumatic intubation by paramedic student with Hobson video laryngoscope and direct supervision by Dr. Villalobos. Dentition in preanesthetic state.

## 2024-07-05 NOTE — ANESTHESIA POSTPROCEDURE EVALUATION
Patient: Naresh Vang    Procedure Summary       Date: 07/05/24 Room / Location: [facility] / [facility]    Anesthesia Start: 0727 Anesthesia Stop: 0918    Procedure: ROBOTIC ASSISTED LEFT POSSIBLE RIGHT & LEFT INGUINAL HERNIA REPAIR (Bilateral: Abdomen) Diagnosis:       Inguinal hernia of left side without obstruction or gangrene      (Inguinal hernia of left side without obstruction or gangrene [K40.90])    Surgeons: Baldemar Child MD Responsible Provider: Margarito Villalobos MD    Anesthesia Type: general ASA Status: 3            Anesthesia Type: general    Vitals Value Taken Time   /74 07/05/24 0915   Temp 36..4 07/05/24 0918   Pulse 80 07/05/24 0917   Resp 14 07/05/24 0918   SpO2 98 % 07/05/24 0917   Vitals shown include unfiled device data.    Anesthesia Post Evaluation    Patient location during evaluation: PACU  Patient participation: complete - patient participated  Level of consciousness: awake and alert  Pain score: 0  Pain management: adequate  Airway patency: patent  Cardiovascular status: acceptable and hemodynamically stable  Respiratory status: acceptable and face mask  Hydration status: acceptable  Postoperative Nausea and Vomiting: none        There were no known notable events for this encounter.

## 2024-07-05 NOTE — OP NOTE
3ROBOTIC ASSISTED LEFT POSSIBLE RIGHT & LEFT INGUINAL HERNIA REPAIR (B) Operative Note     Date: 2024  OR Location: [facility]    Name: Naresh Vang, : 1945, Age: 78 y.o., MRN: [id number], Sex: male    Diagnosis  Pre-op Diagnosis     * Inguinal hernia of left side without obstruction or gangrene [K40.90] Post-op Diagnosis     * Inguinal hernia of left side without obstruction or gangrene [K40.90]     Procedures  ROBOTIC ASSISTED LEFT POSSIBLE RIGHT & LEFT INGUINAL HERNIA REPAIR  [id number] - MA LAPAROSCOPY SURG RPR INITIAL INGUINAL HERNIA      Surgeons      * Baldemar Child - Primary    Resident/Fellow/Other Assistant:  Surgeons and Role:  * No surgeons found with a matching role *    Procedure Summary  Anesthesia: Anesthesia type not filed in the log.  ASA: III  Anesthesia Staff: Anesthesiologist: Margarito Villalobos MD  CRNA: REYNOLD Boyce-CRNA  Estimated Blood Loss: 3mL  Intra-op Medications:   Administrations occurring from 0730 to 0940 on 24:   Medication Name Total Dose   BUPivacaine HCl (Marcaine) 0.5 % (5 mg/mL) injection 40 mL   bupivacaine PF (Marcaine) 0.25 % (2.5 mg/mL) injection 20 mL   cloNIDine PF (Duraclon) injection 200 mcg   sterile water irrigation solution 1,000 mL   ceFAZolin in dextrose (iso-os) (Ancef) IVPB 2 g 2 g              Anesthesia Record               Intraprocedure I/O Totals       None           Specimen: No specimens collected     Staff:   Circulator: Jennifer  Scrub Person: Dolores         Drains and/or Catheters:   Urethral Catheter Non-latex 16 Fr. (Active)       Tourniquet Times:         Implants:  Implants       Type Name Action Serial No.      Surgical Mesh Sling Implant MESH, PROGRIP LAP, 10 X 15 CM, FLATSHEET - [id number] Implanted      Surgical Mesh Sling Implant MESH, PROGRIP LAP, 10 X 15 CM, FLATSHEET - [id number] Implanted               Findings: Bilateral indirect inguinal hernias    Indications: Naresh Vang is an 78 y.o. male who is having surgery  for Inguinal hernia of left side without obstruction or gangrene [K40.90].  Reducible left inguinal hernia probable right inguinal hernia symptomatic left inguinal hernia    The patient was seen in the preoperative area. The risks, benefits, complications, treatment options, non-operative alternatives, expected recovery and outcomes were discussed with the patient. The possibilities of reaction to medication, pulmonary aspiration, injury to surrounding structures, bleeding, recurrent infection, the need for additional procedures, failure to diagnose a condition, and creating a complication requiring transfusion or operation were discussed with the patient. The patient concurred with the proposed plan, giving informed consent.  The site of surgery was properly noted/marked if necessary per policy. The patient has been actively warmed in preoperative area. Preoperative antibiotics have been ordered and given within 1 hours of incision. Venous thrombosis prophylaxis have been ordered including bilateral sequential compression devices    Procedure Details: Patient was placed on the operating table.  After adequate levels of induction of general tracheal anesthesia the abdomen prepped and draped in a sterile fashion.  2 phase audible timeout including participation by the patient the preinduction phase was performed in standard fashion.  All questions were answered all agreed to proceed.  Pascal catheter was in place.  Scrotal wrap was in place.  Mini laparotomy at the midline approximately 2 cm's below the costal margin was outlined using the marking pen.  The area was anesthetized 0.5% Marcaine plain throughout the skin and subcutaneous tissue.  Skin incision was carried out from the dermis to subcutaneous tissue using the knife.  Behind blunt dissection the midline was identified tented between hemostats and opened using sharp scissor dissection to atraumatically into the abdominal cavity.  8 mm da Rolanda port was  introduced pneumoperitoneum established to 15 cm.  Patient was placed in Trendelenburg position.  Presentation of the pelvis revealed a large indirect left inguinal hernia with a smaller indirect right inguinal hernia.  Two 8 mm da Rolanda ports were then placed approximate 10 cm's from the midline port in standard fashion after local section 0.5% Marcaine.  2 pieces of 10 x 15 ProGrip mesh were appropriately tailored, enclosing a 2 OV lock needle and passed respectively into the abdominal cavity under laparoscopic vision.  The patient was maintained in approximately 18 degrees of Trendelenburg position.  The da Rolanda X Xi robot was then docked in standard fashion.  The operation moved to the console.  With scissors to the right, cardia grasper to the left, the peritoneum was scored approximately 4 cm's above and lateral to the epigastrics on the right side.  The dissection moved medially to the level of the umbilical ligament carefully preserving the epigastric vessels.  The dissection moved down inferiorly to expose Jez's ligament and 2 cm's beyond the space of Retzius.  Hemostasis was excellent.  Dissection then moved laterally taking the indirect sac off of the cord structures.  The dissection moved laterally to accommodate the mesh.  There was minimal lipomatous tissue along the cord.  The overlying tissue between the parietal and visceral compartments and inguinal canal were then divided behind cautery dissection to allow for accommodation of the mesh.  Scissors were exchanged for the needle .  The mesh was then centered over the defect open in standard fashion with at least 2 cm's beyond the pubic tubercle.  The mesh was extremely flat.  Peritoneum was then closed from a lateral to medial aspect using the 2 oh V-Loc needle.  The needle was removed under laparoscopic vision.  Attention was then directed to the contralateral side.  The epiploica of the sigmoid colon were tethered in the hernia sac.  This  was taken off behind cautery dissection and sharp scissor dissection with no energy contact of the colon.  This revealed again the large indirect defect.  There was no sliding component.  The peritoneum was scored approximately again 4 cm's above and lateral to the epigastrics behind sharp scissor and cautery dissection.  The dissection moved at the peritoneal plane to the level of the umbilical ligament.  As the dissection moved inferiorly identified the mesh from the contralateral side.  The pubic tubercle was identified the space of Retzius was identified and again allow for passage of the mesh to release 2 cm's beyondThe space of Retzius.  The dissection then moved laterally taking the large indirect sac off of the cord structures behind cautery and sharp scissor dissection.  The vasculature to the cord was meticulously maintained.  There was a large indirect sac.  The dissection then moved laterally to allow for accommodation of the mesh.  Again the overlying tissue overlying the epigastric vessels was divided to allow for accommodation of the mesh and opening the parietal and visceral components of the canal.  The mesh was then centered over the defect after the excisions were exchanged for the needle .  The mesh extended approximately a centimeter and a half beyond the midline overlapping the prior mesh.  Mesh was extremely flat and again descended into the space of Retzius.  2 oh V-Loc needle which in part to the abdominal cavity was then used to close the defect from a lateral to medial aspect.  I used the redundant sac to bolster the peritoneal repair as well as closing the defect within the sac as the area was completely reperitonealized.  The needle was removed under laparoscopic vision.  I examined the area all structures were intact.  There is nones of active hemorrhage.  50 cc of 0.25% Marcaine with clonidine was used as a tap block over the lower abdomen after the robot was undocked.  Fascial  defect at the midline was closed with sutures of 0 Vicryl using the spy device.  Skin was closed  Subcuticular closures of 4-0 Vicryl.  Steri-Strips dry sterile occlusive dressings were applied.  Pascal catheter was removed.  He tolerated seizure without complication, transferred to the recovery following extubation in stable condition  Sponge count were to be correct  Complications:  None; patient tolerated the procedure well.    Disposition: PACU - hemodynamically stable.  Condition: stable         Additional Details: This note was generated with voice recognition software and may contain errors, including spelling, grammar, syntax and miss recognition of what was dictated, they are not fully corrected.    Attending Attestation:     Baldemar Child  Phone Number: [phone]

## 2024-07-25 ENCOUNTER — APPOINTMENT (OUTPATIENT)
Dept: SURGERY | Facility: CLINIC | Age: 79
End: 2024-07-25
Payer: MEDICARE

## 2024-07-25 ENCOUNTER — HOSPITAL ENCOUNTER (OUTPATIENT)
Dept: CARDIOLOGY | Facility: CLINIC | Age: 79
Discharge: HOME | End: 2024-07-25
Payer: MEDICARE

## 2024-07-25 VITALS
SYSTOLIC BLOOD PRESSURE: 140 MMHG | OXYGEN SATURATION: 96 % | HEART RATE: 83 BPM | DIASTOLIC BLOOD PRESSURE: 100 MMHG | BODY MASS INDEX: 27.37 KG/M2 | RESPIRATION RATE: 16 BRPM | HEIGHT: 69 IN | WEIGHT: 184.8 LBS | TEMPERATURE: 97.2 F

## 2024-07-25 DIAGNOSIS — I48.21 PERMANENT ATRIAL FIBRILLATION (MULTI): ICD-10-CM

## 2024-07-25 DIAGNOSIS — K40.20 NON-RECURRENT BILATERAL INGUINAL HERNIA WITHOUT OBSTRUCTION OR GANGRENE: Primary | ICD-10-CM

## 2024-07-25 PROCEDURE — 3077F SYST BP >= 140 MM HG: CPT | Performed by: SURGERY

## 2024-07-25 PROCEDURE — 93306 TTE W/DOPPLER COMPLETE: CPT | Performed by: INTERNAL MEDICINE

## 2024-07-25 PROCEDURE — 1159F MED LIST DOCD IN RCRD: CPT | Performed by: SURGERY

## 2024-07-25 PROCEDURE — 93306 TTE W/DOPPLER COMPLETE: CPT

## 2024-07-25 PROCEDURE — 99024 POSTOP FOLLOW-UP VISIT: CPT | Performed by: SURGERY

## 2024-07-25 PROCEDURE — 1160F RVW MEDS BY RX/DR IN RCRD: CPT | Performed by: SURGERY

## 2024-07-25 PROCEDURE — 3080F DIAST BP >= 90 MM HG: CPT | Performed by: SURGERY

## 2024-07-25 NOTE — PATIENT INSTRUCTIONS
Thank you for choosing [facility].  At your recent surgery, bilateral inguinal hernias were repaired.  At the present time your surgical incisions are without signs of infection.  There are no restrictions to diet nor activity.  Please warm up cooldown in response to any organized physical activity.  You may experience some pulling pain over the area which is not unusual.  He may use Tylenol or ibuprofen for any discomfort.  Please follow-up in the office for any questions or concerns following your recent surgery

## 2024-07-25 NOTE — PROGRESS NOTES
Naresh Vang underwent laparoscopic robotically assisted repair of bilateral inguinal hernias July 5.  He has returned to his preoperative level of activity.  He denies redness swelling drainage from the surgical sites.  No swelling in the inguinal region.  Denies chest pain shortness of breath leg pain  General  Mental status: Alert. General Appearance: Not in acute distress. Orientation: Oriented X4.  Respiratory effort symmetric without accessory muscle use  Abdomen  Inspection: Inspection of the abdomen reveals - non-distended. Surgical Incision Details:  Palpation/Percussion: Palpation and Percussion of the abdomen reveal - Soft and non tender.    Peripheral Vasclar  Lower Extremity:  Palpation: Edema - Left: no edema. Right: no edema.  Excellent postop course.  No evidence of seroma and area of large indirect left inguinal hernia.  No restrictions to diet nor activity.  Follow-up as needed

## 2024-07-26 LAB
AORTIC VALVE MEAN GRADIENT: 4.9 MMHG
AORTIC VALVE PEAK VELOCITY: 1.52 M/S
AV PEAK GRADIENT: 9.2 MMHG
AVA (PEAK VEL): 2.32 CM2
AVA (VTI): 2.31 CM2
EJECTION FRACTION APICAL 4 CHAMBER: 47.3
EJECTION FRACTION: 53 %
LEFT ATRIUM VOLUME AREA LENGTH INDEX BSA: 35 ML/M2
LEFT VENTRICLE INTERNAL DIMENSION DIASTOLE: 4.88 CM (ref 3.5–6)
LEFT VENTRICULAR OUTFLOW TRACT DIAMETER: 2.06 CM
RIGHT VENTRICLE FREE WALL PEAK S': 0.7 CM/S
RIGHT VENTRICLE PEAK SYSTOLIC PRESSURE: 40.9 MMHG
TRICUSPID ANNULAR PLANE SYSTOLIC EXCURSION: 1.3 CM

## 2024-08-29 ENCOUNTER — TELEPHONE (OUTPATIENT)
Dept: CARDIOLOGY | Facility: CLINIC | Age: 79
End: 2024-08-29
Payer: MEDICARE

## 2024-08-29 DIAGNOSIS — I48.21 PERMANENT ATRIAL FIBRILLATION (MULTI): ICD-10-CM

## 2024-08-29 NOTE — TELEPHONE ENCOUNTER
Patient stopped in requesting a refill on his metoprolol tartrate 100Mg 90 days sent to [facility] on [address], any questions please reach out to patient, thank you

## 2024-08-30 RX ORDER — METOPROLOL TARTRATE 100 MG/1
100 TABLET ORAL EVERY 12 HOURS
Qty: 180 TABLET | Refills: 3 | Status: SHIPPED | OUTPATIENT
Start: 2024-08-30 | End: 2025-08-30

## 2024-09-05 ENCOUNTER — TELEPHONE (OUTPATIENT)
Dept: CARDIOLOGY | Facility: CLINIC | Age: 79
End: 2024-09-05
Payer: MEDICARE

## 2024-09-05 DIAGNOSIS — I10 HYPERTENSION, UNSPECIFIED TYPE: ICD-10-CM

## 2024-09-05 RX ORDER — LOSARTAN POTASSIUM 50 MG/1
50 TABLET ORAL DAILY
Qty: 90 TABLET | Refills: 3 | Status: SHIPPED | OUTPATIENT
Start: 2024-09-05

## 2024-09-05 NOTE — TELEPHONE ENCOUNTER
Patient stopped in asking for a refill on his Losartan 50MG 90 day supply called [facility] [address]  Any questions please reach out to patient, thank you

## 2025-02-24 ENCOUNTER — TELEPHONE (OUTPATIENT)
Dept: CARDIOLOGY | Facility: CLINIC | Age: 80
End: 2025-02-24
Payer: MEDICARE

## 2025-02-24 DIAGNOSIS — I10 HYPERTENSION, UNSPECIFIED TYPE: ICD-10-CM

## 2025-02-24 NOTE — TELEPHONE ENCOUNTER
Patient stopped in requesting a refill on his chlorthalidone (Hygroton) 25 mg tablet Take 0.5 tablets (12.5 mg) by mouth once daily.   Please send to [facility] on [address], any questions please give him a call, thank you

## 2025-02-25 RX ORDER — CHLORTHALIDONE 25 MG/1
12.5 TABLET ORAL DAILY
Qty: 45 TABLET | Refills: 3 | Status: SHIPPED | OUTPATIENT
Start: 2025-02-25

## 2025-06-11 ENCOUNTER — APPOINTMENT (OUTPATIENT)
Dept: CARDIOLOGY | Facility: CLINIC | Age: 80
End: 2025-06-11
Payer: MEDICARE

## 2025-06-11 VITALS
DIASTOLIC BLOOD PRESSURE: 80 MMHG | HEIGHT: 69 IN | HEART RATE: 63 BPM | SYSTOLIC BLOOD PRESSURE: 120 MMHG | OXYGEN SATURATION: 95 % | WEIGHT: 186 LBS | BODY MASS INDEX: 27.55 KG/M2

## 2025-06-11 DIAGNOSIS — I48.21 PERMANENT ATRIAL FIBRILLATION (MULTI): ICD-10-CM

## 2025-06-11 DIAGNOSIS — I10 BENIGN HYPERTENSION: Primary | ICD-10-CM

## 2025-06-11 LAB
Q ONSET: 229 MS
QRS COUNT: 17 BEATS
QRS DURATION: 82 MS
QT INTERVAL: 352 MS
QTC CALCULATION(BAZETT): 469 MS
QTC FREDERICIA: 427 MS
R AXIS: 83 DEGREES
T AXIS: 9 DEGREES
T OFFSET: 405 MS
VENTRICULAR RATE: 107 BPM

## 2025-06-11 PROCEDURE — 93005 ELECTROCARDIOGRAM TRACING: CPT | Performed by: INTERNAL MEDICINE

## 2025-06-11 PROCEDURE — 99213 OFFICE O/P EST LOW 20 MIN: CPT | Performed by: INTERNAL MEDICINE

## 2025-06-11 PROCEDURE — 99212 OFFICE O/P EST SF 10 MIN: CPT

## 2025-06-11 PROCEDURE — 3079F DIAST BP 80-89 MM HG: CPT | Performed by: INTERNAL MEDICINE

## 2025-06-11 PROCEDURE — 1159F MED LIST DOCD IN RCRD: CPT | Performed by: INTERNAL MEDICINE

## 2025-06-11 PROCEDURE — 3074F SYST BP LT 130 MM HG: CPT | Performed by: INTERNAL MEDICINE

## 2025-06-11 NOTE — PROGRESS NOTES
"    History Of Present Illness:      This is a 79-year-old male with permanent atrial fibrillation.  He has no complaints of palpitations, chest pain, shortness of breath.    Review of Systems  Other review of systems negative  Last Recorded Vitals:      7/5/2024     9:45 AM 7/5/2024    10:00 AM 7/5/2024    10:15 AM 7/5/2024    10:30 AM 7/5/2024    10:45 AM 7/25/2024     8:13 AM 6/11/2025     9:23 AM   Vitals   Systolic 111 108 121 109 105 140 120   Diastolic 73 80 70 65 68 100 80   BP Location       Left arm   Heart Rate 83 89 83 82 85 83 63   Temp      36.2 °C (97.2 °F)    Resp 18 18 18 18 18 16    Height      1.753 m (5' 9\") 1.753 m (5' 9\")   Weight (lb)      184.8 186   BMI      27.29 kg/m2 27.47 kg/m2   BSA (m2)      2.02 m2 2.03 m2   Visit Report      Report Report     Allergies:  Patient has no known allergies.  Outpatient Medications:  Current Outpatient Medications   Medication Instructions    Advair Diskus 250-50 mcg/dose diskus inhaler 1 puff, inhalation, 2 times daily    albuterol 90 mcg/actuation inhaler 2 puffs, inhalation, 3 times daily RT    atorvastatin (LIPITOR) 20 mg, oral, Daily    atropine 1 % ophthalmic solution 1 drop, ophthalmic (eye), Daily RT    brimonidine (AlphaGAN) 0.2 % ophthalmic solution 2 drops, Both Eyes, 2 times daily    chlorthalidone (HYGROTON) 12.5 mg, oral, Daily    dorzolamide-timoloL (Cosopt) 22.3-6.8 mg/mL ophthalmic solution 1 drop, Both Eyes, 2 times daily    Eliquis 5 mg, oral, 2 times daily    fluticasone (Flonase) 50 mcg/actuation nasal spray 2 sprays, Each Nostril, Daily    latanoprost (Xalatan) 0.005 % ophthalmic solution 1 drop, Both Eyes, Daily    losartan (COZAAR) 50 mg, oral, Daily    metoprolol tartrate (LOPRESSOR) 100 mg, oral, Every 12 hours    timolol (Timoptic) 0.5 % ophthalmic solution Both Eyes, Daily       Physical Exam:    General Appearance:  Alert, oriented, no distress  Skin:  Warm and dry  Head and Neck:  No elevation of JVP, no carotid " "bruits  Cardiac Exam:  Rhythm is irregular, S1 and S2 are normal, no murmur S3 or S4  Lungs:  Clear to auscultation  Extremities:  no edema  Neurologic:  No focal deficits  Psychiatric:  Appropriate mood and behavior    Lab Results:    CMP:  Recent Labs     06/27/24  1253      K 4.0      CO2 29   ANIONGAP 12   BUN 24*   CREATININE 1.22   EGFR 61   No results for input(s): \"ALBUMIN\", \"ALKPHOS\", \"ALT\", \"AST\", \"BILITOT\", \"LIPASE\" in the last 40006 hours.    No lab exists for component: \"CA\"  CBC:  Recent Labs     06/27/24  1253   WBC 10.4   HGB 14.9   HCT 42.7      MCV 90     COAG: No results for input(s): \"PTT\", \"INR\", \"HAUF\", \"DDIMERVTE\", \"HAPTOGLOBIN\", \"FIBRINOGEN\" in the last 26100 hours.  Cardiology Tests (personally reviewed):    I have personally review the diagnostic cardiac testing and my interpretation is as follows:     EKG: Atrial fibrillation with a controlled ventricular response  Holter monitor June 2023: Atrial fibrillation with an average heart rate of 99 bpm     EKG June 11, 2025: Atrial fibrillation    Assessment/Plan   Problem List Items Addressed This Visit           ICD-10-CM    Permanent atrial fibrillation (Multi) I48.21    Naresh remains in atrial fibrillation and has a controlled ventricular response.  Continue metoprolol tartrate 25 mg twice daily and Eliquis 5 mg twice daily for anticoagulation.  We can consider left atrial appendage closure in the future.  We discussed the Watchman indications but at this time he is doing well on anticoagulation and has had no hemorrhagic complications.         Relevant Orders    ECG 12 lead (Clinic Performed) (Completed)    Benign hypertension - Primary I10    Blood pressure is under good control on chlorthalidone and losartan.            James C Ramicone, DO    "

## 2025-06-11 NOTE — ASSESSMENT & PLAN NOTE
Naresh remains in atrial fibrillation and has a controlled ventricular response.  Continue metoprolol tartrate 25 mg twice daily and Eliquis 5 mg twice daily for anticoagulation.  We can consider left atrial appendage closure in the future.  We discussed the Watchman indications but at this time he is doing well on anticoagulation and has had no hemorrhagic complications.

## 2025-08-04 ENCOUNTER — CLINICAL SUPPORT (OUTPATIENT)
Dept: AUDIOLOGY | Facility: CLINIC | Age: 80
End: 2025-08-04
Payer: MEDICARE

## 2025-08-04 DIAGNOSIS — H90.3 SNHL (SENSORY-NEURAL HEARING LOSS), ASYMMETRICAL: Primary | ICD-10-CM

## 2025-08-04 PROCEDURE — 92557 COMPREHENSIVE HEARING TEST: CPT | Performed by: AUDIOLOGIST

## 2025-08-04 PROCEDURE — 92550 TYMPANOMETRY & REFLEX THRESH: CPT | Mod: 52 | Performed by: AUDIOLOGIST

## 2025-08-04 ASSESSMENT — ENCOUNTER SYMPTOMS
DEPRESSION: 0
LOSS OF SENSATION IN FEET: 0
OCCASIONAL FEELINGS OF UNSTEADINESS: 0

## 2025-08-04 ASSESSMENT — PAIN - FUNCTIONAL ASSESSMENT: PAIN_FUNCTIONAL_ASSESSMENT: 0-10

## 2025-08-04 ASSESSMENT — PAIN SCALES - GENERAL: PAINLEVEL_OUTOF10: 0 - NO PAIN

## 2025-08-04 NOTE — PROGRESS NOTES
"AUDIOLOGY ADULT AUDIOMETRIC EVALUATION      Name:  Naresh Vang  :  1945  Age:  79 y.o.  Date of Evaluation:  2025    HISTORY  Reason for visit:  hearing loss  Mr. Vang is seen 2025  for an evaluation of hearing.      Chief complaint:    Right hearing loss    Hearing loss:  right hearing loss for about 6 months  Tinnitus:   denies  Otitis Media: denies  Otologic surgical history:  denies  Dizziness/imbalance:  denies  Otalgia:  ear pain from cold breezes; none today  Ear pressure/fullness:  denies  History of excessive noise exposure:  yes,  (saws)  Other: none    Hearing aid history: none         EVALUATION  Please find audiogram in \"Media\" tab (Document Type:  Audiology Report) or included at the bottom of this note.    RESULTS   Otoscopic Evaluation: minimal cerumen bilaterally     Immittance Measures (226 Hz probe tone):   Tympanometry is consistent with normal middle ear pressure and normal tympanic membrane mobility bilaterally.    Note rounded traces bilaterally.    Ipsilateral acoustic reflexes were present for 500-1000 Hz (absent 4272-2274 Hz) bilaterally.      Test technique:  standard behavioral technique via insert earphones.  Reliability is good.    Pure Tone Audiometry:  Hearing sensitivity is in the normal hearing to severe hearing loss range bilaterally.    Note asymmetry essentially across the frequency range (left worse than right)     Speech Audiometry:        Right Ear:  Speech Reception Threshold (SRT) was obtained at 40 dBHL                 Speech discrimination score was 84% in quiet when words were presented at 90 dBHL      Left Ear:  Speech Reception Threshold (SRT) was obtained at 65 dBHL                 Speech discrimination score was 64% in quiet when words were presented at 105 dBHL    IMPRESSIONS:  Patient is expected to have communication difficulty in adverse listening environments.    Patient is expected to benefit from devices that provide " amplification (e.g., hearing aids) and improve the desired sound signal over that of background noise as well as from effective communication strategies.      RECOMMENDATIONS  Consultation with an ear, nose, and throat (ENT) specialist regarding asymmetry  Hearing Aid Evaluation with an audiologist to discuss hearing technology (such as hearing aids) and services. Patient was encouraged to check into insurance benefit for hearing aids and identify contracted providers.  If patient does not have (or does not wish to use) a hearing aid benefit, he may return to [facility] for hearing aid devices and services.  Reassess hearing in 1 year (or sooner if medically indicated or if there is a concern for a change in hearing).    Continue with medical follow-up as indicated.       PATIENT EDUCATION  Discussed results and recommendations with patient.  Questions were addressed and the patient was encouraged to contact our department should concerns arise.       JAKUB Werner, CCC-A  Licensed Audiologist

## 2025-08-28 DIAGNOSIS — I48.21 PERMANENT ATRIAL FIBRILLATION (MULTI): ICD-10-CM

## 2025-09-02 RX ORDER — METOPROLOL TARTRATE 100 MG/1
100 TABLET ORAL EVERY 12 HOURS
Qty: 180 TABLET | Refills: 3 | Status: SHIPPED | OUTPATIENT
Start: 2025-09-02

## (undated) DEVICE — SLEEVE, VASO PRESS, CALF GARMENT, MEDIUM, GREEN

## (undated) DEVICE — SEAL, UNIVERSAL 5-8MM  XI

## (undated) DEVICE — STRIP, SKIN CLOSURE, STERI STRIP, REINFORCED, 0.5 X 4 IN

## (undated) DEVICE — COVER, TIP HOT SHEARS ENDOWRIST

## (undated) DEVICE — TUBING SET, BIFURCATED, SMOKE EVAC, FILTERED, F/AIRSEAL

## (undated) DEVICE — DRAPE, COLUMN, DAVINCI XI

## (undated) DEVICE — SCISSOR TIP, ENDOCUT, LAPAROSCOPIC

## (undated) DEVICE — BANDAGE, GAUZE, CONFORMING, KERLIX, 6 PLY, 4.5 IN X 4.1 YD

## (undated) DEVICE — SCISSORS, MONOPOLAR, CURVED, 8MM

## (undated) DEVICE — SYRINGE, 10 CC, SLIP TIP

## (undated) DEVICE — GRASPER TIP, MODIFIED RAPTOR, 5MM, DISP

## (undated) DEVICE — CAUTERY, PENCIL, PUSH BUTTON, SMOKE EVAC, 70MM

## (undated) DEVICE — LUBRICANT, ELECTROLUBE, F/ELECTRODE TIPS

## (undated) DEVICE — DRAPE, ARM XI

## (undated) DEVICE — FORCEPS, CADIERE, DAVINCI XI

## (undated) DEVICE — BANDAGE, COFLEX, 6 X 5 YDS, FOAM TAN, STERILE, LF

## (undated) DEVICE — CARE KIT, LAPAROSCOPIC, ADVANCED

## (undated) DEVICE — NEEDLE, CLOSURE, OMNICLOSE

## (undated) DEVICE — DRAPE, SHEET, THREE QUARTER, FAN FOLD, 57 X 77 IN

## (undated) DEVICE — DRIVER, MEGA NEEDLE

## (undated) DEVICE — DRESSING, TELFA, 3X4

## (undated) DEVICE — SYRINGE, 30 CC, LUER LOCK

## (undated) DEVICE — Device

## (undated) DEVICE — GOWN, ASTOUND, XL

## (undated) DEVICE — NEEDLE, EPIDURAL 17G X 4 1/2 PERIFIX

## (undated) DEVICE — DRESSING, TRANSPARENT, TEGADERM, 2-3/8 X 2-3/4 IN

## (undated) DEVICE — CATHETER TRAY, SURESTEP, 16FR, URINE METER W/STATLOCK